# Patient Record
Sex: FEMALE | Race: WHITE | URBAN - METROPOLITAN AREA
[De-identification: names, ages, dates, MRNs, and addresses within clinical notes are randomized per-mention and may not be internally consistent; named-entity substitution may affect disease eponyms.]

---

## 2018-01-01 ENCOUNTER — OFFICE VISIT (OUTPATIENT)
Dept: ORTHOPEDIC SURGERY | Age: 83
End: 2018-01-01

## 2018-01-01 VITALS
WEIGHT: 143.2 LBS | OXYGEN SATURATION: 95 % | BODY MASS INDEX: 24.45 KG/M2 | HEIGHT: 64 IN | SYSTOLIC BLOOD PRESSURE: 135 MMHG | DIASTOLIC BLOOD PRESSURE: 64 MMHG | HEART RATE: 78 BPM

## 2018-01-01 VITALS
SYSTOLIC BLOOD PRESSURE: 133 MMHG | HEART RATE: 87 BPM | WEIGHT: 140 LBS | DIASTOLIC BLOOD PRESSURE: 50 MMHG | BODY MASS INDEX: 23.9 KG/M2 | HEIGHT: 64 IN

## 2018-01-01 VITALS
BODY MASS INDEX: 24.31 KG/M2 | SYSTOLIC BLOOD PRESSURE: 156 MMHG | HEIGHT: 64 IN | DIASTOLIC BLOOD PRESSURE: 68 MMHG | HEART RATE: 94 BPM | RESPIRATION RATE: 14 BRPM | WEIGHT: 142.4 LBS

## 2018-01-01 VITALS
HEIGHT: 64 IN | DIASTOLIC BLOOD PRESSURE: 53 MMHG | SYSTOLIC BLOOD PRESSURE: 138 MMHG | HEART RATE: 79 BPM | OXYGEN SATURATION: 94 % | TEMPERATURE: 97.1 F | BODY MASS INDEX: 24.07 KG/M2 | WEIGHT: 141 LBS

## 2018-01-01 DIAGNOSIS — M43.10 SPONDYLOLISTHESIS, UNSPECIFIED SPINAL REGION: ICD-10-CM

## 2018-01-01 DIAGNOSIS — M54.17 THORACIC AND LUMBOSACRAL NEURITIS: ICD-10-CM

## 2018-01-01 DIAGNOSIS — M51.36 DDD (DEGENERATIVE DISC DISEASE), LUMBAR: Primary | ICD-10-CM

## 2018-01-01 DIAGNOSIS — M51.36 DDD (DEGENERATIVE DISC DISEASE), LUMBAR: ICD-10-CM

## 2018-01-01 DIAGNOSIS — M54.14 THORACIC RADICULOPATHY: ICD-10-CM

## 2018-01-01 DIAGNOSIS — M54.14 THORACIC AND LUMBOSACRAL NEURITIS: ICD-10-CM

## 2018-01-01 DIAGNOSIS — M54.16 LUMBAR RADICULOPATHY: ICD-10-CM

## 2018-01-01 DIAGNOSIS — M51.26 DISPLACEMENT OF LUMBAR INTERVERTEBRAL DISC WITHOUT MYELOPATHY: Primary | ICD-10-CM

## 2018-01-01 RX ORDER — HYDROXYUREA 500 MG/1
CAPSULE ORAL
COMMUNITY
Start: 2018-01-01

## 2018-01-01 RX ORDER — GABAPENTIN 600 MG/1
600 TABLET ORAL 3 TIMES DAILY
Qty: 90 TAB | Refills: 1 | Status: SHIPPED | OUTPATIENT
Start: 2018-01-01

## 2018-01-01 RX ORDER — DEXTROMETHORPHAN HYDROBROMIDE, GUAIFENESIN 5; 100 MG/5ML; MG/5ML
650 LIQUID ORAL EVERY 8 HOURS
COMMUNITY

## 2018-01-01 RX ORDER — GABAPENTIN 100 MG/1
100 CAPSULE ORAL 3 TIMES DAILY
Qty: 90 CAP | Refills: 1 | Status: SHIPPED | OUTPATIENT
Start: 2018-01-01 | End: 2018-01-01 | Stop reason: SDUPTHER

## 2018-01-01 RX ORDER — PRAVASTATIN SODIUM 20 MG/1
TABLET ORAL
COMMUNITY
Start: 2018-01-01

## 2018-01-01 RX ORDER — LORAZEPAM 0.5 MG/1
TABLET ORAL
COMMUNITY
Start: 2018-01-01

## 2018-01-01 RX ORDER — GUAIFENESIN 100 MG/5ML
81 LIQUID (ML) ORAL DAILY
COMMUNITY

## 2018-01-01 RX ORDER — GABAPENTIN 100 MG/1
300 CAPSULE ORAL
Qty: 90 CAP | Refills: 1 | Status: SHIPPED | OUTPATIENT
Start: 2018-01-01 | End: 2018-01-01 | Stop reason: DRUGHIGH

## 2018-01-01 RX ORDER — GABAPENTIN 600 MG/1
600 TABLET ORAL 3 TIMES DAILY
Qty: 270 TAB | Refills: 0 | Status: SHIPPED | OUTPATIENT
Start: 2018-01-01

## 2018-01-01 RX ORDER — HYDROCODONE BITARTRATE AND ACETAMINOPHEN 5; 325 MG/1; MG/1
TABLET ORAL
COMMUNITY
Start: 2018-01-01

## 2018-01-01 RX ORDER — GABAPENTIN 300 MG/1
300 CAPSULE ORAL
Qty: 90 CAP | Refills: 1 | Status: SHIPPED | OUTPATIENT
Start: 2018-01-01

## 2018-06-25 PROBLEM — M51.26 DISPLACEMENT OF LUMBAR INTERVERTEBRAL DISC WITHOUT MYELOPATHY: Status: ACTIVE | Noted: 2018-01-01

## 2018-06-25 PROBLEM — M51.36 DDD (DEGENERATIVE DISC DISEASE), LUMBAR: Status: ACTIVE | Noted: 2018-01-01

## 2018-06-25 PROBLEM — M43.10 SPONDYLOLISTHESIS: Status: ACTIVE | Noted: 2018-01-01

## 2018-06-25 PROBLEM — M54.17 THORACIC AND LUMBOSACRAL NEURITIS: Status: ACTIVE | Noted: 2018-01-01

## 2018-06-25 PROBLEM — M54.14 THORACIC AND LUMBOSACRAL NEURITIS: Status: ACTIVE | Noted: 2018-01-01

## 2018-06-25 NOTE — MR AVS SNAPSHOT
[address] [facility] 
 
 
 [address] [facility] 
[phone] Patient: Jluis Jiménez MRN: [id number] Visit Information Date & Time Provider Department Dept. Phone Encounter #  
 2018 10:00 AM Winston Murray MD [facility] [place] [phone] [id number] Follow-up Instructions Return in about 4 weeks (around 2018). Upcoming Health Maintenance Date Due DTaP/Tdap/Td series (1 - Tdap) 1955 ZOSTER VACCINE AGE 60> 1994 GLAUCOMA SCREENING Q2Y 1999 Bone Densitometry (Dexa) Screening 1999 Pneumococcal 65+ Low/Medium Risk (1 of 2 - PCV13) 1999 MEDICARE YEARLY EXAM 3/14/2018 Influenza Age 5 to Adult 2018 Allergies as of 2018  Review Complete On: 2018 By: Selena Lilly No Known Allergies Current Immunizations  Never Reviewed No immunizations on file. Not reviewed this visit You Were Diagnosed With   
  
 Codes Comments Displacement of lumbar intervertebral disc without myelopathy    -  Primary ICD-10-CM: M51.26 
ICD-9-CM: 722.10 Thoracic and lumbosacral neuritis     ICD-10-CM: M54.14, M54.17 ICD-9-CM: 724.4 DDD (degenerative disc disease), lumbar     ICD-10-CM: M51.36 
ICD-9-CM: 722.52 Spondylolisthesis, unspecified spinal region     ICD-10-CM: M43.10 ICD-9-CM: 756.12 Vitals BP Pulse Temp Height(growth percentile) Weight(growth percentile) SpO2  
 138/53 79 97.1 °F (36.2 °C) (Oral) 5' 4\" (1.626 m) 141 lb (64 kg) 94% BMI OB Status Smoking Status 24.2 kg/m2 Postmenopausal Former Smoker BMI and BSA Data Body Mass Index Body Surface Area  
 24.2 kg/m 2 1.7 m 2 Preferred Pharmacy Pharmacy Name Phone [facility] [address], [address] [place] [phone] Your Updated Medication List  
  
   
 This list is accurate as of 6/25/18 11:12 AM.  Always use your most recent med list.  
  
  
  
  
 ACTOS 30 mg tablet Generic drug:  pioglitazone Take 15 mg by mouth daily. ALLEGRA 180 mg tablet Generic drug:  fexofenadine Take  by mouth daily. aspirin 81 mg chewable tablet Take 81 mg by mouth daily. ferrous sulfate 325 mg (65 mg iron) Cper Take  by mouth. furosemide 20 mg tablet Commonly known as:  LASIX Take 20 mg by mouth daily. gabapentin 100 mg capsule Commonly known as:  NEURONTIN Take 1 Cap by mouth three (3) times daily. glyBURIDE 5 mg tablet Commonly known as:  Ashlee Buttery Take 5 mg by mouth daily. HYDROcodone-acetaminophen 5-325 mg per tablet Commonly known as:  NORCO  
  
 hydroxyurea 500 mg capsule Commonly known as:  HYDREA  
  
 lisinopril 10 mg tablet Commonly known as:  Jamarcus Littler Take  by mouth daily. LORazepam 0.5 mg tablet Commonly known as:  ATIVAN  
  
 metFORMIN 500 mg tablet Commonly known as:  GLUCOPHAGE Take 250 mg by mouth four (4) times daily (with meals). NexIUM 20 mg capsule Generic drug:  esomeprazole Take  by mouth daily. * pravastatin 10 mg tablet Commonly known as:  PRAVACHOL Take 20 mg by mouth nightly. * pravastatin 20 mg tablet Commonly known as:  PRAVACHOL  
  
 * TYLENOL EXTRA STRENGTH 500 mg tablet Generic drug:  acetaminophen Take  by mouth every six (6) hours as needed for Pain. * TYLENOL ARTHRITIS PAIN 650 mg Fatou Mince Generic drug:  acetaminophen Take 650 mg by mouth every eight (8) hours. UREA-ALPHA HYDROXY ACIDS EX  
by Apply Externally route. * Notice: This list has 4 medication(s) that are the same as other medications prescribed for you. Read the directions carefully, and ask your doctor or other care provider to review them with you. Prescriptions Sent to Pharmacy Refills gabapentin (NEURONTIN) 100 mg capsule 1 Sig: Take 1 Cap by mouth three (3) times daily. Class: Normal  
 Pharmacy: [facility] [address], [address] [facility] #: [phone] Route: Oral  
  
Follow-up Instructions Return in about 4 weeks (around 7/23/2018). Introducing [facility] SERVICES! Nathalia Fonseca introduces [employer] patient portal. Now you can access parts of your medical record, email your doctor's office, and request medication refills online. 1. In your internet browser, go to [email or web address] 2. Click on the First Time User? Click Here link in the Sign In box. You will see the New Member Sign Up page. 3. Enter your [employer] Access Code exactly as it appears below. You will not need to use this code after youve completed the sign-up process. If you do not sign up before the expiration date, you must request a new code. · [employer] Access Code: [email or web address] Expires: 9/23/2018  9:58 AM 
 
4. Enter the last four digits of your Social Security Number (xxxx) and Date of Birth (mm/dd/yyyy) as indicated and click Submit. You will be taken to the next sign-up page. 5. Create a [employer] ID. This will be your [employer] login ID and cannot be changed, so think of one that is secure and easy to remember. 6. Create a [employer] password. You can change your password at any time. 7. Enter your Password Reset Question and Answer. This can be used at a later time if you forget your password. 8. Enter your e-mail address. You will receive e-mail notification when new information is available in [address] 9. Click Sign Up. You can now view and download portions of your medical record. 10. Click the Download Summary menu link to download a portable copy of your medical information. If you have questions, please visit the Frequently Asked Questions section of the [employer] website.  Remember, [employer] is NOT to be used for urgent needs. For medical emergencies, dial 911. Now available from your iPhone and Android! Please provide this summary of care documentation to your next provider. Your primary care clinician is listed as Efrain Raimrez. If you have any questions after today's visit, please call [phone].

## 2018-06-25 NOTE — PROGRESS NOTES
[facility]  [address]  [address], [address]  Phone: [phone]  Fax: [phone]        INITIAL CONSULTATION      HISTORY OF PRESENT ILLNESS:  Darcie Thompson is a 80 y.o. female. I last saw this patient in 2012. she rates her pain 8/10. Pt is accompanied by her daughter today. She presents with c/o  left sided buttock pain into the left leg to the top of the foot in a roughly L4 distribution x this year. She denies hx of specific injury or trauma. She states that the pain feels different than when she was seen previously. Pain is worsened by movement, and alleviated by sitting. Patient is treating with Tylenol x 2/day with some relief. Pt is [place]. She was seen previously with a similar complaint of left sided buttock pain going into the left leg. She reports doing well at that time. Pt recalls weight loss a while ago due to sickness, although her weight is currently stable. Pt reports a rash under her right arm x 6/25/18. Pt reports going to he ER. The ER prescribed her medicine, although the patient cannot recall which. Per her daughter, possibly [place]. Pt reports an A1C of 6.6 last time it was tested. Pt denies change in bowel or bladder habits. The patient is RHD. Lumbar spine XR dated 6/5/18 reviewed. Per report, straightening of the lumbar curvature. 4 mm anterior subluxation L4 on L5. Slight degenerative narrowing of the superior hip joints and moderate to severe degenerative changes of the visualized spine. Mild to moderate vascular calcifications.  reviewed. Body mass index is 24.2 kg/(m^2).     PCP: Emely Ulloa MD    Past Medical History:   Diagnosis Date    [facility] [address] [facility]     Diabetes ([facility] [facility]     GERD (gastroesophageal reflux disease)     Hypertension     Ill-defined condition     low platelets          Past Surgical History:   Procedure Laterality Date    HX CAROTID ENDARTERECTOMY Right 2012    HX CHOLECYSTECTOMY  2010 Social History   Substance Use Topics    Smoking status: Former Smoker     Quit date: 1/1/2010    Smokeless tobacco: Never Used    Alcohol use No     Work status: The patient is N/A. Marital status: The patient is . Current Outpatient Prescriptions   Medication Sig Dispense Refill    HYDROcodone-acetaminophen (NORCO) 5-325 mg per tablet       hydroxyurea (HYDREA) 500 mg capsule       pravastatin (PRAVACHOL) 20 mg tablet       LORazepam (ATIVAN) 0.5 mg tablet       aspirin 81 mg chewable tablet Take 81 mg by mouth daily.  acetaminophen (TYLENOL ARTHRITIS PAIN) 650 mg TbER Take 650 mg by mouth every eight (8) hours.  gabapentin (NEURONTIN) 100 mg capsule Take 1 Cap by mouth three (3) times daily. 90 Cap 1    metFORMIN (GLUCOPHAGE) 500 mg tablet Take 250 mg by mouth four (4) times daily (with meals).  glyBURIDE (DIABETA) 5 mg tablet Take 5 mg by mouth daily.  lisinopril (PRINIVIL, ZESTRIL) 10 mg tablet Take  by mouth daily.  furosemide (LASIX) 20 mg tablet Take 20 mg by mouth daily.  ferrous sulfate 325 mg (65 mg iron) cpER Take  by mouth.  fexofenadine (ALLEGRA) 180 mg tablet Take  by mouth daily.  esomeprazole (NEXIUM) 20 mg capsule Take  by mouth daily.  acetaminophen (TYLENOL EXTRA STRENGTH) 500 mg tablet Take  by mouth every six (6) hours as needed for Pain.  pioglitazone (ACTOS) 30 mg tablet Take 15 mg by mouth daily.  pravastatin (PRAVACHOL) 10 mg tablet Take 20 mg by mouth nightly.  UREA-ALPHA HYDROXY ACIDS EX by Apply Externally route. No Known Allergies       History reviewed. No pertinent family history.       REVIEW OF SYSTEMS  Constitutional symptoms: Negative  Eyes: Negative  Ears, Nose, Throat, and Mouth: Negative  Cardiovascular: Negative  Respiratory: Negative  Genitourinary: Negative  Integumentary (Skin and/or breast): Negative  Musculoskeletal: Positive for left buttock and LLE pain into the foot  Extremities: Negative for edema. Endocrine/Rheumatologic: Negative  Hematologic/Lymphatic: Negative  Allergic/Immunologic: Negative  Psychiatric: Negative     Ambulates with a single point cane. PHYSICAL EXAMINATION    Visit Vitals    /53    Pulse 79    Temp 97.1 °F (36.2 °C) (Oral)    Ht 5' 4\" (1.626 m)    Wt 64 kg (141 lb)    SpO2 94%    BMI 24.2 kg/m2       CONSTITUTIONAL: NAD, A&O x 3  HEART: Regular rate and rhythm  ABDOMEN: Positive bowel sounds, soft, nontender, and nondistended  LUNGS: Clear to auscultation bilaterally. SKIN: Negative for rash. RANGE OF MOTION: The patient has full passive range of motion in all four extremities. SENSATION: Decreased sensation to light touch anterior surface from the knee to the ankle of the LLE. Sensation to light touch otherwise intact. MOTOR:   Straight Leg Raise: Negative, bilateral  Vega: Negative, bilateral  Deep tendon reflexes are 0 at the brachioradialis, biceps, and triceps. Deep tendon reflexes are 0 at the knees and ankles bilaterally. Shoulder AB/Flex Elbow Flex Wrist Ext Elbow Ext Wrist Flex Hand Intrin Tone   Right +4/5 +4/5 +4/5 +4/5 +4/5 +4/5 +4/5   Left +4/5 +4/5 +4/5 +4/5 +4/5 +4/5 +4/5              Hip Flex Knee Ext Knee Flex Ankle DF GTE Ankle PF Tone   Right +4/5 +4/5 +4/5 +4/5 +4/5 +4/5 +4/5   Left +4/5 +4/5 +4/5 +4/5 +4/5 +4/5 +4/5         ASSESSMENT   Diagnoses and all orders for this visit:    1. Displacement of lumbar intervertebral disc without myelopathy    2. Thoracic and lumbosacral neuritis    3. DDD (degenerative disc disease), lumbar    4. Spondylolisthesis, unspecified spinal region    Other orders  -     gabapentin (NEURONTIN) 100 mg capsule; Take 1 Cap by mouth three (3) times daily. IMPRESSIONS/RECOMMENDATIONS:  Patient presents with left sided buttock pain radiating into the LLE to the top of the foot in a roughly L4 distribution. She describes symptoms consistent with spinal stenosis.  She is not interested in lumbar blocks at this time. I will restart her on Neurontin 100 mg TID. Patient is advised to call office if intolerant to medication. I will check with her PCP about her DM before prescribing an MDP. Following clearance with him I will start her on Medrol Dosepak. I will skip NSAIDs following MDP due to her taking aspirin. Patient is neurologically intact. Multiple treatment options were discussed. I will see the patient back in 1 month's time or earlier if needed. Written by Aileen Rivera, as dictated by Luisa Guthrie MD  I examined the patient, reviewed and agree with the note.

## 2018-07-23 PROBLEM — M54.14 THORACIC RADICULOPATHY: Status: ACTIVE | Noted: 2018-01-01

## 2018-07-23 PROBLEM — M54.16 LUMBAR RADICULOPATHY: Status: ACTIVE | Noted: 2018-01-01

## 2018-07-23 NOTE — PROGRESS NOTES
[facility]  [address] [facility]  [address], [address]   Phone: [phone]  Fax: [phone]        PROGRESS NOTE      HISTORY OF PRESENT ILLNESS:  The patient is a 80 y.o. female and was seen today for follow up of left sided buttock pain into the left leg to the top of the foot in a roughly L5 distribution x this year. She denies hx of specific injury or trauma. She states that the pain feels different than when she was seen previously. Pain is worsened by movement, and alleviated by sitting. Patient is treating with Tylenol x 2/day with some relief. Pt is [place]. She was seen previously with a similar complaint of left sided buttock pain going into the left leg. She reports doing well at that time. Pt recalls weight loss a while ago due to sickness, although her weight is currently stable. Pt reports a rash under her right arm x 6/25/18. Pt reports going to he ER. The ER prescribed her medicine, although the patient cannot recall which. Per her daughter, possibly Tere Paiz. Pt reports an A1C of 6.6 last time it was tested. Pt denies change in bowel or bladder habits. The patient is RHD. Lumbar spine XR dated 6/5/18 reviewed. Per report, straightening of the lumbar curvature. 4 mm anterior subluxation L4 on L5. Slight degenerative narrowing of the superior hip joints and moderate to severe degenerative changes of the visualized spine. Mild to moderate vascular calcifications. At her last clinical appointment, patient presented with left sided buttock pain radiating into the LLE to the top of the foot in a roughly L5 distribution. She described symptoms consistent with spinal stenosis. She was not interested in lumbar blocks at that time. I restarted her on Neurontin 100 mg TID. I checked with her PCP about her DM before prescribing an MDP. Following clearance with him I started her on Medrol Dosepak. I skipped NSAIDs following MDP due to her taking aspirin.  Patient was neurologically intact. The patient returns today with low back pain and pain in her lower LLE, sometimes radiating, sometimes localized to the calf. She rates her pain 0-4/10, a decrease from her last visit (8/10). Pt is accompanied by her daughter today. She is tolerating Neurontin 100 mg TID with some relief. She never received her prescription for the MDP. ER note from Dr. Monica Correa dated 6/5/18 indicating patient presented for left hip and back pain. She was given Norco despite having a codeine allergy.  reviewed. Body mass index is 24.03 kg/(m^2). PCP: Chandler Tolbert MD      Past Medical History:   Diagnosis Date    [facility]) ([facility] [address]     Diabetes ([facility] [address]     GERD (gastroesophageal reflux disease)     Hypertension     Ill-defined condition     low platelets        Social History     Social History    Marital status:      Spouse name: N/A    Number of children: N/A    Years of education: N/A     Occupational History    Not on file. Social History Main Topics    Smoking status: Former Smoker     Quit date: 1/1/2010    Smokeless tobacco: Never Used    Alcohol use No    Drug use: Not on file    Sexual activity: Not on file     Other Topics Concern    Not on file     Social History Narrative       Current Outpatient Prescriptions   Medication Sig Dispense Refill    gabapentin (NEURONTIN) 300 mg capsule Take 1 Cap by mouth three (3) times daily (with meals). 90 Cap 1    hydroxyurea (HYDREA) 500 mg capsule       pravastatin (PRAVACHOL) 20 mg tablet       LORazepam (ATIVAN) 0.5 mg tablet       aspirin 81 mg chewable tablet Take 81 mg by mouth daily.  acetaminophen (TYLENOL ARTHRITIS PAIN) 650 mg TbER Take 650 mg by mouth every eight (8) hours.  metFORMIN (GLUCOPHAGE) 500 mg tablet Take 250 mg by mouth four (4) times daily (with meals).  glyBURIDE (DIABETA) 5 mg tablet Take 5 mg by mouth daily.       lisinopril (PRINIVIL, ZESTRIL) 10 mg tablet Take  by mouth daily.  furosemide (LASIX) 20 mg tablet Take 20 mg by mouth daily.  pioglitazone (ACTOS) 30 mg tablet Take 15 mg by mouth daily.  ferrous sulfate 325 mg (65 mg iron) cpER Take  by mouth.  fexofenadine (ALLEGRA) 180 mg tablet Take  by mouth daily.  esomeprazole (NEXIUM) 20 mg capsule Take  by mouth daily.  acetaminophen (TYLENOL EXTRA STRENGTH) 500 mg tablet Take  by mouth every six (6) hours as needed for Pain.  HYDROcodone-acetaminophen (NORCO) 5-325 mg per tablet       UREA-ALPHA HYDROXY ACIDS EX by Apply Externally route. No Known Allergies       PHYSICAL EXAMINATION    Visit Vitals    /50    Pulse 87    Ht 5' 4\" (1.626 m)    Wt 63.5 kg (140 lb)    BMI 24.03 kg/m2       CONSTITUTIONAL: NAD, A&O x 3  SENSATION: Intact to light touch throughout  RANGE OF MOTION: The patient has full passive range of motion in all four extremities. MOTOR:  Straight Leg Raise: Negative, bilateral               Hip Flex Knee Ext Knee Flex Ankle DF GTE Ankle PF Tone   Right +4/5 +4/5 +4/5 +4/5 +4/5 +4/5 +4/5   Left +4/5 +4/5 +4/5 +4/5 +4/5 +4/5 +4/5       ASSESSMENT   Diagnoses and all orders for this visit:    1. DDD (degenerative disc disease), lumbar  -     gabapentin (NEURONTIN) 300 mg capsule; Take 1 Cap by mouth three (3) times daily (with meals). 2. Spondylolisthesis, unspecified spinal region  -     gabapentin (NEURONTIN) 300 mg capsule; Take 1 Cap by mouth three (3) times daily (with meals). 3. Lumbar radiculopathy  -     gabapentin (NEURONTIN) 300 mg capsule; Take 1 Cap by mouth three (3) times daily (with meals). 4. Thoracic radiculopathy  -     gabapentin (NEURONTIN) 300 mg capsule; Take 1 Cap by mouth three (3) times daily (with meals). IMPRESSION AND PLAN:  I will increase her Neurontin to 300 mg TID. Patient advised to call the office if intolerant to higher dose. Patient is neurologically intact.  Patient is not interested in surgical intervention or lumbar blocks at this time. I will see the patient back in 1 month's time or earlier if needed. Written by Naseem Denise, as dictated by Coy Shin MD  I examined the patient, reviewed and agree with the note.

## 2018-07-23 NOTE — MR AVS SNAPSHOT
[address] [facility] 
 
 
 [address] 200 [facility] 
[phone] Patient: Anam Thomas MRN: [id number] :1934 Visit Information Date & Time Provider Department Dept. Phone Encounter #  
 2018 10:50 AM Clarisse Yoon MD [facility], [address] [facility] [place] [phone] [id number] Follow-up Instructions Return in about 4 weeks (around 2018). Upcoming Health Maintenance Date Due DTaP/Tdap/Td series (1 - Tdap) 1955 ZOSTER VACCINE AGE 60> 1994 GLAUCOMA SCREENING Q2Y 1999 Bone Densitometry (Dexa) Screening 1999 Pneumococcal 65+ Low/Medium Risk (1 of 2 - PCV13) 1999 MEDICARE YEARLY EXAM 3/14/2018 Influenza Age 5 to Adult 2018 Allergies as of 2018  Review Complete On: 2018 By: Clarisse Yoon MD  
 No Known Allergies Current Immunizations  Never Reviewed No immunizations on file. Not reviewed this visit You Were Diagnosed With   
  
 Codes Comments DDD (degenerative disc disease), lumbar    -  Primary ICD-10-CM: M51.36 
ICD-9-CM: 722.52 Spondylolisthesis, unspecified spinal region     ICD-10-CM: M43.10 ICD-9-CM: 756.12 Lumbar radiculopathy     ICD-10-CM: M54.16 
ICD-9-CM: 724.4 Thoracic radiculopathy     ICD-10-CM: M54.14 
ICD-9-CM: 724.4 Vitals BP Pulse Height(growth percentile) Weight(growth percentile) BMI OB Status 133/50 87 5' 4\" (1.626 m) 140 lb (63.5 kg) 24.03 kg/m2 Postmenopausal  
 Smoking Status Former Smoker Vitals History BMI and BSA Data Body Mass Index Body Surface Area 24.03 kg/m 2 1.69 m 2 Preferred Pharmacy Pharmacy Name Phone [address], [address] [place] [phone] Your Updated Medication List  
  
   
This list is accurate as of 18 11:26 AM.  Always use your most recent med list.  
 ACTOS 30 mg tablet Generic drug:  pioglitazone Take 15 mg by mouth daily. ALLEGRA 180 mg tablet Generic drug:  fexofenadine Take  by mouth daily. aspirin 81 mg chewable tablet Take 81 mg by mouth daily. ferrous sulfate 325 mg (65 mg iron) Cper Take  by mouth. furosemide 20 mg tablet Commonly known as:  LASIX Take 20 mg by mouth daily. gabapentin 100 mg capsule Commonly known as:  NEURONTIN Take 3 Caps by mouth three (3) times daily (with meals). glyBURIDE 5 mg tablet Commonly known as:  Guillermina Bangura Take 5 mg by mouth daily. HYDROcodone-acetaminophen 5-325 mg per tablet Commonly known as:  NORCO  
  
 hydroxyurea 500 mg capsule Commonly known as:  HYDREA  
  
 lisinopril 10 mg tablet Commonly known as:  Merilynn Renan Take  by mouth daily. LORazepam 0.5 mg tablet Commonly known as:  ATIVAN  
  
 metFORMIN 500 mg tablet Commonly known as:  GLUCOPHAGE Take 250 mg by mouth four (4) times daily (with meals). NexIUM 20 mg capsule Generic drug:  esomeprazole Take  by mouth daily. pravastatin 20 mg tablet Commonly known as:  PRAVACHOL  
  
 * TYLENOL EXTRA STRENGTH 500 mg tablet Generic drug:  acetaminophen Take  by mouth every six (6) hours as needed for Pain. * TYLENOL ARTHRITIS PAIN 650 mg Giovanni Said Generic drug:  acetaminophen Take 650 mg by mouth every eight (8) hours. UREA-ALPHA HYDROXY ACIDS EX  
by Apply Externally route. * Notice: This list has 2 medication(s) that are the same as other medications prescribed for you. Read the directions carefully, and ask your doctor or other care provider to review them with you. Prescriptions Sent to Pharmacy Refills  
 gabapentin (NEURONTIN) 100 mg capsule 1 Sig: Take 3 Caps by mouth three (3) times daily (with meals).   
 Class: Normal  
 Pharmacy: [address], [address] [place] Ph #: [phone] Route: Oral  
  
Follow-up Instructions Return in about 4 weeks (around 8/20/2018). Introducing [facility]! [employer] introduces [employer] patient portal. Now you can access parts of your medical record, email your doctor's office, and request medication refills online. 1. In your internet browser, go to [email or web address] 2. Click on the First Time User? Click Here link in the Sign In box. You will see the New Member Sign Up page. 3. Enter your [employer] Access Code exactly as it appears below. You will not need to use this code after youve completed the sign-up process. If you do not sign up before the expiration date, you must request a new code. · [employer] Access Code: [email or web address] Expires: 9/23/2018  9:58 AM 
 
4. Enter the last four digits of your Social Security Number (xxxx) and Date of Birth (mm/dd/yyyy) as indicated and click Submit. You will be taken to the next sign-up page. 5. Create a [employer] ID. This will be your [employer] login ID and cannot be changed, so think of one that is secure and easy to remember. 6. Create a [employer] password. You can change your password at any time. 7. Enter your Password Reset Question and Answer. This can be used at a later time if you forget your password. 8. Enter your e-mail address. You will receive e-mail notification when new information is available in [address] 9. Click Sign Up. You can now view and download portions of your medical record. 10. Click the Download Summary menu link to download a portable copy of your medical information. If you have questions, please visit the Frequently Asked Questions section of the [employer] website. Remember, [employer] is NOT to be used for urgent needs. For medical emergencies, dial 911. Now available from your iPhone and Android! Please provide this summary of care documentation to your next provider. Your primary care clinician is listed as [facility]. If you have any questions after today's visit, please call [phone].

## 2018-08-21 NOTE — PROGRESS NOTES
[facility]  [address], [place]   Phone: [phone]  Fax: [phone]        PROGRESS NOTE      HISTORY OF PRESENT ILLNESS:  The patient is a 80 y.o. female and was seen today for follow up of low back pain and pain in her lower LLE, sometimes radiating, sometimes localized to the calf. She was previously seen with c/o left sided buttock pain into the left leg to the top of the foot in a roughly L5 distribution x this year. She denies hx of specific injury or trauma. She states that the pain feels different than when she was seen previously. Pain is worsened by movement, and alleviated by sitting. Patient is treating with Tylenol x 2/day with some relief. Pt is [place]. She was seen previously with a similar complaint of left sided buttock pain going into the left leg. She reports doing well at that time. Pt recalls weight loss a while ago due to sickness, although her weight is currently stable. Pt reports a rash under her right arm x 6/25/18. Pt reports going to he ER. The ER prescribed her medicine, although the patient cannot recall which. Per her daughter, possibly Isidro Drummond. Pt reports an A1C of 6.6 last time it was tested. Pt denies change in bowel or bladder habits. ER note from Dr. Amy Castro dated 6/5/18 indicating patient presented for left hip and back pain. She was given Norco despite having a codeine allergy. The patient is RHD. Lumbar spine XR dated 6/5/18 reviewed. Per report, straightening of the lumbar curvature. 4 mm anterior subluxation L4 on L5. Slight degenerative narrowing of the superior hip joints and moderate to severe degenerative changes of the visualized spine. Mild to moderate vascular calcifications. At her last clinical appointment, I increased her Neurontin to 300 mg TID. The patient returns today with pain location and distribution remain unchanged. She rates her pain 4/10, consistent with her last visit (0-4/10).  Pt is accompanied by her daughter today. She is tolerating increased Neurontin 300 mg TID with some additional benefit. Despite the numbers, she reports that her pain is more tolerable with the higher dose. Patient takes Tylenol x 4/day. Pt denies change in bowel or bladder habits.  reviewed. Body mass index is 24.58 kg/(m^2). PCP: Liza Storey MD      Past Medical History:   Diagnosis Date    [facility]     Diabetes [facility]     GERD (gastroesophageal reflux disease)     Hypertension     Ill-defined condition     low platelets        Social History     Social History    Marital status:      Spouse name: N/A    Number of children: N/A    Years of education: N/A     Occupational History    Not on file. Social History Main Topics    Smoking status: Former Smoker     Quit date: 1/1/2010    Smokeless tobacco: Never Used    Alcohol use No    Drug use: Not on file    Sexual activity: Not on file     Other Topics Concern    Not on file     Social History Narrative       Current Outpatient Prescriptions   Medication Sig Dispense Refill    gabapentin (NEURONTIN) 600 mg tablet Take 1 Tab by mouth three (3) times daily. 90 Tab 1    gabapentin (NEURONTIN) 300 mg capsule Take 1 Cap by mouth three (3) times daily (with meals). 90 Cap 1    HYDROcodone-acetaminophen (NORCO) 5-325 mg per tablet       hydroxyurea (HYDREA) 500 mg capsule       pravastatin (PRAVACHOL) 20 mg tablet       LORazepam (ATIVAN) 0.5 mg tablet       aspirin 81 mg chewable tablet Take 81 mg by mouth daily.  acetaminophen (TYLENOL ARTHRITIS PAIN) 650 mg TbER Take 650 mg by mouth every eight (8) hours.  metFORMIN (GLUCOPHAGE) 500 mg tablet Take 250 mg by mouth four (4) times daily (with meals).  glyBURIDE (DIABETA) 5 mg tablet Take 5 mg by mouth daily.  lisinopril (PRINIVIL, ZESTRIL) 10 mg tablet Take  by mouth daily.       furosemide (LASIX) 20 mg tablet Take 20 mg by mouth daily.      ferrous sulfate 325 mg (65 mg iron) cpER Take  by mouth.  esomeprazole (NEXIUM) 20 mg capsule Take  by mouth daily.  UREA-ALPHA HYDROXY ACIDS EX by Apply Externally route.  fexofenadine (ALLEGRA) 180 mg tablet Take  by mouth daily.  acetaminophen (TYLENOL EXTRA STRENGTH) 500 mg tablet Take  by mouth every six (6) hours as needed for Pain. No Known Allergies     Ambulates with a single point cane. PHYSICAL EXAMINATION    Visit Vitals    /64    Pulse 78    Ht 5' 4\" (1.626 m)    Wt 65 kg (143 lb 3.2 oz)    LMP  (Exact Date)    SpO2 95%    BMI 24.58 kg/m2       CONSTITUTIONAL: NAD, A&O x 3  SENSATION: Intact to light touch throughout  RANGE OF MOTION: The patient has full passive range of motion in all four extremities. MOTOR:  Straight Leg Raise: Negative, bilateral               Hip Flex Knee Ext Knee Flex Ankle DF GTE Ankle PF Tone   Right +4/5 +4/5 +4/5 +4/5 +4/5 +4/5 +4/5   Left +4/5 +4/5 +4/5 +4/5 +4/5 +4/5 +4/5       ASSESSMENT   Diagnoses and all orders for this visit:    1. DDD (degenerative disc disease), lumbar    2. Thoracic radiculopathy    3. Lumbar radiculopathy    4. Spondylolisthesis, unspecified spinal region    Other orders  -     gabapentin (NEURONTIN) 600 mg tablet; Take 1 Tab by mouth three (3) times daily. IMPRESSION AND PLAN:  I will increase her Neurontin to 600 mg TID. Patient advised to call the office if intolerant to higher dose. Patient is neurologically intact. Patient is not interested in surgical intervention or lumbar blocks at this time. I will see the patient back in 1 month's time or earlier if needed. Written by Ledyi Malin, as dictated by Annemarie Pires MD  I examined the patient, reviewed and agree with the note.

## 2018-08-21 NOTE — MR AVS SNAPSHOT
[address] [facility] 
 
 
 [address] 706 [facility] 
[phone] Patient: Lukas Ureña MRN: [id number] :1934 Visit Information Date & Time Provider Department Dept. Phone Encounter #  
 2018 10:25 AM Agustín Recinos MD [facility] [place] [phone] [id number] Follow-up Instructions Return in about 4 weeks (around 2018). Upcoming Health Maintenance Date Due DTaP/Tdap/Td series (1 - Tdap) 1955 ZOSTER VACCINE AGE 60> 1994 GLAUCOMA SCREENING Q2Y 1999 Bone Densitometry (Dexa) Screening 1999 Pneumococcal 65+ Low/Medium Risk (1 of 2 - PCV13) 1999 MEDICARE YEARLY EXAM 3/14/2018 Influenza Age 5 to Adult 2018 Allergies as of 2018  Review Complete On: 2018 By: Agustín Recinos MD  
 No Known Allergies Current Immunizations  Never Reviewed No immunizations on file. Not reviewed this visit You Were Diagnosed With   
  
 Codes Comments DDD (degenerative disc disease), lumbar    -  Primary ICD-10-CM: M51.36 
ICD-9-CM: 722.52 Thoracic radiculopathy     ICD-10-CM: M54.14 
ICD-9-CM: 724.4 Lumbar radiculopathy     ICD-10-CM: M54.16 
ICD-9-CM: 724.4 Spondylolisthesis, unspecified spinal region     ICD-10-CM: M43.10 ICD-9-CM: 756.12 Vitals BP Pulse Height(growth percentile) Weight(growth percentile) LMP SpO2  
 135/64 78 5' 4\" (1.626 m) 143 lb 3.2 oz (65 kg) (Exact Date) 95% BMI OB Status Smoking Status 24.58 kg/m2 Postmenopausal Former Smoker BMI and BSA Data Body Mass Index Body Surface Area 24.58 kg/m 2 1.71 m 2 Preferred Pharmacy Pharmacy Name Phone [address], [address] [place] [phone] Your Updated Medication List  
  
   
This list is accurate as of 18 11:28 AM.  Always use your most recent med list. ALLEGRA 180 mg tablet Generic drug:  fexofenadine Take  by mouth daily. aspirin 81 mg chewable tablet Take 81 mg by mouth daily. ferrous sulfate 325 mg (65 mg iron) Cper Take  by mouth. furosemide 20 mg tablet Commonly known as:  LASIX Take 20 mg by mouth daily. * gabapentin 300 mg capsule Commonly known as:  NEURONTIN Take 1 Cap by mouth three (3) times daily (with meals). * gabapentin 600 mg tablet Commonly known as:  NEURONTIN Take 1 Tab by mouth three (3) times daily. glyBURIDE 5 mg tablet Commonly known as:  [place] Take 5 mg by mouth daily. HYDROcodone-acetaminophen 5-325 mg per tablet Commonly known as:  NORCO  
  
 hydroxyurea 500 mg capsule Commonly known as:  HYDREA  
  
 lisinopril 10 mg tablet Commonly known as:  Annette Escort Take  by mouth daily. LORazepam 0.5 mg tablet Commonly known as:  ATIVAN  
  
 metFORMIN 500 mg tablet Commonly known as:  GLUCOPHAGE Take 250 mg by mouth four (4) times daily (with meals). NexIUM 20 mg capsule Generic drug:  esomeprazole Take  by mouth daily. pravastatin 20 mg tablet Commonly known as:  PRAVACHOL  
  
 * TYLENOL EXTRA STRENGTH 500 mg tablet Generic drug:  acetaminophen Take  by mouth every six (6) hours as needed for Pain. * TYLENOL ARTHRITIS PAIN 650 mg Terry Galas Generic drug:  acetaminophen Take 650 mg by mouth every eight (8) hours. UREA-ALPHA HYDROXY ACIDS EX  
by Apply Externally route. * Notice: This list has 4 medication(s) that are the same as other medications prescribed for you. Read the directions carefully, and ask your doctor or other care provider to review them with you. Prescriptions Sent to Pharmacy Refills  
 gabapentin (NEURONTIN) 600 mg tablet 1 Sig: Take 1 Tab by mouth three (3) times daily.   
 Class: Normal  
 Pharmacy: [address] [place] [place] [address] #: [phone] Route: Oral  
  
Follow-up Instructions Return in about 4 weeks (around 9/18/2018). Introducing [facility]! [employer] introduces [employer] patient portal. Now you can access parts of your medical record, email your doctor's office, and request medication refills online. 1. In your internet browser, go to [email or web address] 2. Click on the First Time User? Click Here link in the Sign In box. You will see the New Member Sign Up page. 3. Enter your [employer] Access Code exactly as it appears below. You will not need to use this code after youve completed the sign-up process. If you do not sign up before the expiration date, you must request a new code. · [employer] Access Code: [email or web address] Expires: 9/23/2018  9:58 AM 
 
4. Enter the last four digits of your Social Security Number (xxxx) and Date of Birth (mm/dd/yyyy) as indicated and click Submit. You will be taken to the next sign-up page. 5. Create a [employer] ID. This will be your [employer] login ID and cannot be changed, so think of one that is secure and easy to remember. 6. Create a [employer] password. You can change your password at any time. 7. Enter your Password Reset Question and Answer. This can be used at a later time if you forget your password. 8. Enter your e-mail address. You will receive e-mail notification when new information is available in [address] 9. Click Sign Up. You can now view and download portions of your medical record. 10. Click the Download Summary menu link to download a portable copy of your medical information. If you have questions, please visit the Frequently Asked Questions section of the [employer] website. Remember, [employer] is NOT to be used for urgent needs. For medical emergencies, dial 911. Now available from your iPhone and Android! Please provide this summary of care documentation to your next provider. Your primary care clinician is listed as [facility]. If you have any questions after today's visit, please call [phone].

## 2018-09-18 NOTE — PROGRESS NOTES
[facility]  [address]  [address], [address] [place]   Phone: [phone]  Fax: [phone]        PROGRESS NOTE      HISTORY OF PRESENT ILLNESS:  The patient is a 80 y.o. female and was seen today for follow up of low back pain and pain in her lower LLE, sometimes radiating, sometimes localized to the calf. She was previously seen with c/o left sided buttock pain into the left leg to the top of the foot in a roughly L5 distribution x this year. She denies hx of specific injury or trauma. She states that the pain feels different than when she was seen previously. Pain is worsened by movement, and alleviated by sitting. Patient is treating with Tylenol x 2/day with some relief. Pt is [place]. She was seen previously with a similar complaint of left sided buttock pain going into the left leg. She reports doing well at that time. Pt recalls weight loss a while ago due to sickness, although her weight is currently stable. Pt reports a rash under her right arm x 6/25/18. Pt reports going to he ER. The ER prescribed her medicine, although the patient cannot recall which. Per her daughter, possibly Esvin Garcia. Pt reports an A1C of 6.6 last time it was tested. Pt denies change in bowel or bladder habits. ER note from Dr. Efrain Wade dated 6/5/18 indicating patient presented for left hip and back pain. She was given Norco despite having a codeine allergy. The patient is RHD. Lumbar spine XR dated 6/5/18 reviewed. Per report, straightening of the lumbar curvature. 4 mm anterior subluxation L4 on L5. Slight degenerative narrowing of the superior hip joints and moderate to severe degenerative changes of the visualized spine. Mild to moderate vascular calcifications. At her last clinical appointment, I increased her Neurontin to 600 mg TID. The patient returns today with pain location and distribution remain unchanged.  She rates her pain 4-5/10, a slight increase from her last visit (4/10). She is tolerating increased Neurontin 600 mg TID with slight additional relief. She reports improvement with her activity tolerance with the higher dose. Patient takes Tylenol x 4/day.  reviewed. Body mass index is 24.44 kg/(m^2). PCP: Chris Charlton MD      Past Medical History:   Diagnosis Date    [facility]) [facility]     Diabetes ([facility]     GERD (gastroesophageal reflux disease)     Hypertension     Ill-defined condition     low platelets        Social History     Social History    Marital status:      Spouse name: N/A    Number of children: N/A    Years of education: N/A     Occupational History    Not on file. Social History Main Topics    Smoking status: Former Smoker     Quit date: 1/1/2010    Smokeless tobacco: Never Used    Alcohol use No    Drug use: Not on file    Sexual activity: Not on file     Other Topics Concern    Not on file     Social History Narrative       Current Outpatient Prescriptions   Medication Sig Dispense Refill    gabapentin (NEURONTIN) 600 mg tablet Take 1 Tab by mouth three (3) times daily. 270 Tab 0    gabapentin (NEURONTIN) 600 mg tablet Take 1 Tab by mouth three (3) times daily. 90 Tab 1    hydroxyurea (HYDREA) 500 mg capsule       pravastatin (PRAVACHOL) 20 mg tablet       aspirin 81 mg chewable tablet Take 81 mg by mouth daily.  acetaminophen (TYLENOL ARTHRITIS PAIN) 650 mg TbER Take 650 mg by mouth every eight (8) hours.  metFORMIN (GLUCOPHAGE) 500 mg tablet Take 250 mg by mouth four (4) times daily (with meals).  glyBURIDE (DIABETA) 5 mg tablet Take 5 mg by mouth daily.  lisinopril (PRINIVIL, ZESTRIL) 10 mg tablet Take  by mouth daily.  ferrous sulfate 325 mg (65 mg iron) cpER Take  by mouth.  esomeprazole (NEXIUM) 20 mg capsule Take  by mouth daily.  gabapentin (NEURONTIN) 300 mg capsule Take 1 Cap by mouth three (3) times daily (with meals).  90 Cap 1    HYDROcodone-acetaminophen (NORCO) 5-325 mg per tablet       LORazepam (ATIVAN) 0.5 mg tablet       furosemide (LASIX) 20 mg tablet Take 20 mg by mouth daily.  fexofenadine (ALLEGRA) 180 mg tablet Take  by mouth daily.  acetaminophen (TYLENOL EXTRA STRENGTH) 500 mg tablet Take  by mouth every six (6) hours as needed for Pain.  UREA-ALPHA HYDROXY ACIDS EX by Apply Externally route. No Known Allergies     Ambulates with a single point cane. PHYSICAL EXAMINATION    Visit Vitals    /68    Pulse 94    Resp 14    Ht 5' 4\" (1.626 m)    Wt 64.6 kg (142 lb 6.4 oz)    BMI 24.44 kg/m2       CONSTITUTIONAL: NAD, A&O x 3  SENSATION: Intact to light touch throughout  RANGE OF MOTION: The patient has full passive range of motion in all four extremities. MOTOR:  Straight Leg Raise: Negative, bilateral               Hip Flex Knee Ext Knee Flex Ankle DF GTE Ankle PF Tone   Right +4/5 +4/5 +4/5 +4/5 +4/5 +4/5 +4/5   Left +4/5 +4/5 +4/5 +4/5 +4/5 +4/5 +4/5       ASSESSMENT   Diagnoses and all orders for this visit:    1. DDD (degenerative disc disease), lumbar    2. Lumbar radiculopathy    3. Thoracic radiculopathy    4. Spondylolisthesis, unspecified spinal region    Other orders  -     gabapentin (NEURONTIN) 600 mg tablet; Take 1 Tab by mouth three (3) times daily. IMPRESSION AND PLAN:  Patient wished to continue her current treatment. I provided her with refills of her Neurontin 600 mg TID. I offered to try her on another neuropathic pain medication or increase her dose but she declined. Patient is neurologically intact. I will see the patient back in 3 month's time or earlier if needed. Written by Nik Garcia, as dictated by Eliceo Tavarez MD  I examined the patient, reviewed and agree with the note.

## 2018-09-18 NOTE — MR AVS SNAPSHOT
[address] [facility] 
 
 
 [address] [facility] 
[phone] Patient: Sangita Riding MRN: [id number] :1934 Visit Information Date & Time Provider Department Dept. Phone Encounter #  
 2018 10:50 AM Wilian Agee MD 4 [facility], [address] [facility] [phone] [id number] Follow-up Instructions Return in about 3 months (around 2018). Upcoming Health Maintenance Date Due DTaP/Tdap/Td series (1 - Tdap) 1955 ZOSTER VACCINE AGE 60> 1994 GLAUCOMA SCREENING Q2Y 1999 Bone Densitometry (Dexa) Screening 1999 Pneumococcal 65+ Low/Medium Risk (1 of 2 - PCV13) 1999 MEDICARE YEARLY EXAM 3/14/2018 Influenza Age 5 to Adult 2018 Allergies as of 2018  Review Complete On: 2018 By: Wilian Agee MD  
 No Known Allergies Current Immunizations  Never Reviewed No immunizations on file. Not reviewed this visit You Were Diagnosed With   
  
 Codes Comments DDD (degenerative disc disease), lumbar    -  Primary ICD-10-CM: M51.36 
ICD-9-CM: 722.52 Lumbar radiculopathy     ICD-10-CM: M54.16 
ICD-9-CM: 724.4 Thoracic radiculopathy     ICD-10-CM: M54.14 
ICD-9-CM: 724.4 Spondylolisthesis, unspecified spinal region     ICD-10-CM: M43.10 ICD-9-CM: 756.12 Vitals BP Pulse Resp Height(growth percentile) Weight(growth percentile) BMI  
 156/68 94 14 5' 4\" (1.626 m) 142 lb 6.4 oz (64.6 kg) 24.44 kg/m2 OB Status Smoking Status Postmenopausal Former Smoker BMI and BSA Data Body Mass Index Body Surface Area  
 24.44 kg/m 2 1.71 m 2 Preferred Pharmacy Pharmacy Name Phone [address], [address] [place] [phone] Your Updated Medication List  
  
   
This list is accurate as of 18 11:37 AM.  Always use your most recent med list.  
  
  
 ALLEGRA 180 mg tablet Generic drug:  fexofenadine Take  by mouth daily. aspirin 81 mg chewable tablet Take 81 mg by mouth daily. ferrous sulfate 325 mg (65 mg iron) Cper Take  by mouth. furosemide 20 mg tablet Commonly known as:  LASIX Take 20 mg by mouth daily. * gabapentin 300 mg capsule Commonly known as:  NEURONTIN Take 1 Cap by mouth three (3) times daily (with meals). * gabapentin 600 mg tablet Commonly known as:  NEURONTIN Take 1 Tab by mouth three (3) times daily. * gabapentin 600 mg tablet Commonly known as:  NEURONTIN Take 1 Tab by mouth three (3) times daily. glyBURIDE 5 mg tablet Commonly known as:  Jeffry Lucks Take 5 mg by mouth daily. HYDROcodone-acetaminophen 5-325 mg per tablet Commonly known as:  NORCO  
  
 hydroxyurea 500 mg capsule Commonly known as:  HYDREA  
  
 lisinopril 10 mg tablet Commonly known as:  Marge Maricruz Take  by mouth daily. LORazepam 0.5 mg tablet Commonly known as:  ATIVAN  
  
 metFORMIN 500 mg tablet Commonly known as:  GLUCOPHAGE Take 250 mg by mouth four (4) times daily (with meals). NexIUM 20 mg capsule Generic drug:  esomeprazole Take  by mouth daily. pravastatin 20 mg tablet Commonly known as:  PRAVACHOL  
  
 * TYLENOL EXTRA STRENGTH 500 mg tablet Generic drug:  acetaminophen Take  by mouth every six (6) hours as needed for Pain. * TYLENOL ARTHRITIS PAIN 650 mg [place] Generic drug:  acetaminophen Take 650 mg by mouth every eight (8) hours. UREA-ALPHA HYDROXY ACIDS EX  
by Apply Externally route. * Notice: This list has 5 medication(s) that are the same as other medications prescribed for you. Read the directions carefully, and ask your doctor or other care provider to review them with you. Prescriptions Sent to Pharmacy  Refills  
 gabapentin (NEURONTIN) 600 mg tablet 0  
 Sig: Take 1 Tab by mouth three (3) times daily. Class: Normal  
 Pharmacy: [facility] [address], [address] [facility] #: [phone] Route: Oral  
  
Follow-up Instructions Return in about 3 months (around 12/18/2018). Introducing [facility] SERVICES! Aileen Deutsch introduces [employer] patient portal. Now you can access parts of your medical record, email your doctor's office, and request medication refills online. 1. In your internet browser, go to [email or web address] 2. Click on the First Time User? Click Here link in the Sign In box. You will see the New Member Sign Up page. 3. Enter your [employer] Access Code exactly as it appears below. You will not need to use this code after youve completed the sign-up process. If you do not sign up before the expiration date, you must request a new code. · [employer] Access Code: [email or web address] Expires: 9/23/2018  9:58 AM 
 
4. Enter the last four digits of your Social Security Number (xxxx) and Date of Birth (mm/dd/yyyy) as indicated and click Submit. You will be taken to the next sign-up page. 5. Create a [employer] ID. This will be your [employer] login ID and cannot be changed, so think of one that is secure and easy to remember. 6. Create a [employer] password. You can change your password at any time. 7. Enter your Password Reset Question and Answer. This can be used at a later time if you forget your password. 8. Enter your e-mail address. You will receive e-mail notification when new information is available in [address] 9. Click Sign Up. You can now view and download portions of your medical record. 10. Click the Download Summary menu link to download a portable copy of your medical information. If you have questions, please visit the Frequently Asked Questions section of the [employer] website. Remember, [employer] is NOT to be used for urgent needs. For medical emergencies, dial 911. Now available from your iPhone and Android! Please provide this summary of care documentation to your next provider. Your primary care clinician is listed as Johanna Cunningham. If you have any questions after today's visit, please call [phone].